# Patient Record
Sex: MALE | URBAN - NONMETROPOLITAN AREA
[De-identification: names, ages, dates, MRNs, and addresses within clinical notes are randomized per-mention and may not be internally consistent; named-entity substitution may affect disease eponyms.]

---

## 2019-04-29 ENCOUNTER — PATIENT MESSAGE (OUTPATIENT)
Dept: UROLOGY | Age: 44
End: 2019-04-29

## 2019-04-29 ENCOUNTER — OFFICE VISIT (OUTPATIENT)
Dept: UROLOGY | Age: 44
End: 2019-04-29
Payer: COMMERCIAL

## 2019-04-29 VITALS — TEMPERATURE: 97.6 F | BODY MASS INDEX: 33.05 KG/M2 | WEIGHT: 244 LBS | HEIGHT: 72 IN

## 2019-04-29 DIAGNOSIS — N48.6 PEYRONIE'S SYNDROME: Primary | ICD-10-CM

## 2019-04-29 DIAGNOSIS — N52.03 COMBINED ARTERIAL INSUFFICIENCY AND CORPORO-VENOUS OCCLUSIVE ERECTILE DYSFUNCTION: ICD-10-CM

## 2019-04-29 LAB
BILIRUBIN, POC: 0
BLOOD URINE, POC: NORMAL
CLARITY, POC: CLEAR
COLOR, POC: YELLOW
GLUCOSE URINE, POC: NORMAL
KETONES, POC: NORMAL
LEUKOCYTE EST, POC: NORMAL
NITRITE, POC: NORMAL
PH, POC: 6.5
PROTEIN, POC: NORMAL
SPECIFIC GRAVITY, POC: 1.02
UROBILINOGEN, POC: 0.2

## 2019-04-29 PROCEDURE — 81003 URINALYSIS AUTO W/O SCOPE: CPT | Performed by: UROLOGY

## 2019-04-29 PROCEDURE — 99204 OFFICE O/P NEW MOD 45 MIN: CPT | Performed by: UROLOGY

## 2019-04-29 RX ORDER — TADALAFIL 10 MG/1
10 TABLET ORAL DAILY PRN
Qty: 20 TABLET | Refills: 5 | Status: SHIPPED | OUTPATIENT
Start: 2019-04-29 | End: 2019-06-12

## 2019-04-29 RX ORDER — OMEPRAZOLE 20 MG/1
CAPSULE, DELAYED RELEASE ORAL
Refills: 8 | COMMUNITY
Start: 2019-04-16

## 2019-04-29 RX ORDER — HYDROXYZINE HYDROCHLORIDE 25 MG/1
TABLET, FILM COATED ORAL
COMMUNITY
Start: 2019-04-27

## 2019-04-29 RX ORDER — MULTIPLE VITAMINS W/ MINERALS TAB 9MG-400MCG
1 TAB ORAL
COMMUNITY

## 2019-04-29 RX ORDER — AMOXICILLIN 875 MG/1
875 TABLET, COATED ORAL 2 TIMES DAILY
COMMUNITY
End: 2019-06-12 | Stop reason: ALTCHOICE

## 2019-04-29 ASSESSMENT — ENCOUNTER SYMPTOMS
COUGH: 0
BLOOD IN STOOL: 0
DIARRHEA: 0
RHINORRHEA: 0
NAUSEA: 0
ABDOMINAL DISTENTION: 0
FACIAL SWELLING: 0
EYE DISCHARGE: 0
BACK PAIN: 0
SINUS PRESSURE: 0
WHEEZING: 0
EYE REDNESS: 0
CONSTIPATION: 0
VOMITING: 0
SORE THROAT: 0
ABDOMINAL PAIN: 0
EYE PAIN: 0
COLOR CHANGE: 0
SHORTNESS OF BREATH: 0

## 2019-04-29 NOTE — PROGRESS NOTES
Mera Liu is a 41 y.o. male who presents today   Chief Complaint   Patient presents with    New Patient     I was referred here by Leif Jimenez for Peyronies     Peyronie's Disease:  Patient is here today for Peyronie's disease which started approximately 10 years(s) ago. His erect penis curves upward. The estimated degree of curvature is 30 degrees. Are erections painful? Sometimes over last year  Pain severity: mild  Problem with penetration? No  Any history of penile injury? no    His erections are partially present. Difficulting maintaining erection? Yes. Premature Ejaculation? No  Sex drive/libido: normal    Previous treatments tried for Peyronie's disease or Erectile Dysfunction:  none          History reviewed. No pertinent past medical history. Past Surgical History:   Procedure Laterality Date    TONSILLECTOMY      WISDOM TOOTH EXTRACTION         Current Outpatient Medications   Medication Sig Dispense Refill    omeprazole (PRILOSEC) 20 MG delayed release capsule TK 1 C PO D  8    hydrOXYzine (ATARAX) 25 MG tablet       Multiple Vitamins-Minerals (THERA M PLUS) TABS Take 1 tablet by mouth      amoxicillin (AMOXIL) 875 MG tablet Take 875 mg by mouth 2 times daily      tadalafil (CIALIS) 10 MG tablet Take 1 tablet by mouth daily as needed for Erectile Dysfunction 20 tablet 5     No current facility-administered medications for this visit.         No Known Allergies    Social History     Socioeconomic History    Marital status: Unknown     Spouse name: None    Number of children: None    Years of education: None    Highest education level: None   Occupational History    None   Social Needs    Financial resource strain: None    Food insecurity:     Worry: None     Inability: None    Transportation needs:     Medical: None     Non-medical: None   Tobacco Use    Smoking status: Former Smoker    Smokeless tobacco: Never Used   Substance and Sexual Activity    Alcohol use: None    Drug use: None    Sexual activity: Yes     Comment: occ   Lifestyle    Physical activity:     Days per week: None     Minutes per session: None    Stress: None   Relationships    Social connections:     Talks on phone: None     Gets together: None     Attends Zoroastrianism service: None     Active member of club or organization: None     Attends meetings of clubs or organizations: None     Relationship status: None    Intimate partner violence:     Fear of current or ex partner: None     Emotionally abused: None     Physically abused: None     Forced sexual activity: None   Other Topics Concern    None   Social History Narrative    None       Family History   Problem Relation Age of Onset    Cancer Maternal Uncle     Cancer Maternal Grandmother        REVIEW OF SYSTEMS:  Review of Systems   Constitutional: Negative for activity change, chills, fatigue and fever. HENT: Negative for congestion, ear discharge, ear pain, facial swelling, mouth sores, rhinorrhea, sinus pressure and sore throat. Eyes: Negative for pain, discharge and redness. Respiratory: Negative for cough, shortness of breath and wheezing. Cardiovascular: Negative for chest pain, palpitations and leg swelling. Gastrointestinal: Negative for abdominal distention, abdominal pain, blood in stool, constipation, diarrhea, nausea and vomiting. Endocrine: Negative for polydipsia, polyphagia and polyuria. Genitourinary: Negative for decreased urine volume, difficulty urinating, dysuria, enuresis, flank pain, frequency, genital sores, hematuria and urgency. Musculoskeletal: Negative for back pain, gait problem, joint swelling, neck pain and neck stiffness. Skin: Negative for color change, rash and wound. Allergic/Immunologic: Negative for environmental allergies and immunocompromised state. Neurological: Negative for dizziness, syncope, weakness, light-headedness, numbness and headaches.    Psychiatric/Behavioral: Negative for agitation, confusion, dysphoric mood, self-injury, sleep disturbance and suicidal ideas. The patient is not hyperactive. PHYSICAL EXAM:  Temp 97.6 °F (36.4 °C) (Temporal)   Ht 6' (1.829 m)   Wt 244 lb (110.7 kg)   BMI 33.09 kg/m²   Physical Exam   Constitutional: He is oriented to person, place, and time. He appears well-developed and well-nourished. HENT:   Head: Normocephalic and atraumatic. Eyes: Conjunctivae are normal. No scleral icterus. Neck: Normal range of motion. Cardiovascular: Normal rate, regular rhythm and intact distal pulses. Pulmonary/Chest: Effort normal and breath sounds normal. No respiratory distress. Abdominal: Soft. Bowel sounds are normal. He exhibits no distension and no mass. There is no tenderness. Hernia confirmed negative in the right inguinal area and confirmed negative in the left inguinal area. Genitourinary: Testes normal and penis normal. Prostate is not enlarged and not tender. Right testis shows no mass, no swelling and no tenderness. Left testis shows no mass, no swelling and no tenderness. Circumcised. No phimosis. No discharge found. Genitourinary Comments: He had a discrete plaque about mid shaft in the dorsal aspect of the corporal bodies consistent with a Peyronie's plaque   Musculoskeletal: Normal range of motion. He exhibits no edema or tenderness. Lymphadenopathy:     He has no cervical adenopathy. Right: No inguinal adenopathy present. Left: No inguinal adenopathy present. Neurological: He is alert and oriented to person, place, and time. Skin: Skin is warm and dry. Psychiatric: He has a normal mood and affect. His behavior is normal.   Nursing note and vitals reviewed.           DATA:    Results for orders placed or performed in visit on 04/29/19   POCT Urinalysis No Micro (Auto)   Result Value Ref Range    Color, UA yellow     Clarity, UA clear     Glucose, UA POC neg     Bilirubin, UA 0     Ketones, UA neg     Spec Grav, UA 1.020     Blood, UA POC neg     pH, UA 6.5     Protein, UA POC neg     Urobilinogen, UA 0.2     Leukocytes, UA neg     Nitrite, UA neg      1. Peyronie's syndrome  He was given some information on Xiaflex today and was told how to document the degree of curvature and the point of curvature. 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 - tadalafil (CIALIS) 10 MG tablet; Take 1 tablet by mouth daily as needed for Erectile Dysfunction  Dispense: 20 tablet; Refill: 5      Orders Placed This Encounter   Procedures    POCT Urinalysis No Micro (Auto)        Return in about 6 weeks (around 6/10/2019). EMR Dragon/transcription disclaimer: Much of this documentt is electronic  transcription/translation of spoken language to printed text. The  electronic translation of spoken language may be erroneous, or at times,  nonsensical words or phrases may be inadvertently transcribed.  Although I  have reviewed the document for such errors, some may still exist.

## 2019-05-15 NOTE — TELEPHONE ENCOUNTER
Patient called. I told him unfortunately this type of medicine usually is not covered even with a PA. He could check online for coupons or ask pharmacy to fill smaller quantity. He voiced understanding.

## 2019-06-12 ENCOUNTER — OFFICE VISIT (OUTPATIENT)
Dept: UROLOGY | Age: 44
End: 2019-06-12
Payer: COMMERCIAL

## 2019-06-12 VITALS — WEIGHT: 245 LBS | BODY MASS INDEX: 33.23 KG/M2 | TEMPERATURE: 97.6 F

## 2019-06-12 DIAGNOSIS — N48.6 PEYRONIE'S SYNDROME: Primary | ICD-10-CM

## 2019-06-12 DIAGNOSIS — N52.03 COMBINED ARTERIAL INSUFFICIENCY AND CORPORO-VENOUS OCCLUSIVE ERECTILE DYSFUNCTION: ICD-10-CM

## 2019-06-12 LAB
BILIRUBIN, POC: 0
BLOOD URINE, POC: NORMAL
CLARITY, POC: CLEAR
COLOR, POC: YELLOW
GLUCOSE URINE, POC: NORMAL
KETONES, POC: NORMAL
LEUKOCYTE EST, POC: NORMAL
NITRITE, POC: NORMAL
PH, POC: 7
PROTEIN, POC: NORMAL
SPECIFIC GRAVITY, POC: 1.01
UROBILINOGEN, POC: 0.2

## 2019-06-12 PROCEDURE — 99213 OFFICE O/P EST LOW 20 MIN: CPT | Performed by: UROLOGY

## 2019-06-12 PROCEDURE — 81003 URINALYSIS AUTO W/O SCOPE: CPT | Performed by: UROLOGY

## 2019-06-12 ASSESSMENT — ENCOUNTER SYMPTOMS
SINUS PRESSURE: 0
BLOOD IN STOOL: 0
CONSTIPATION: 0
COLOR CHANGE: 0
WHEEZING: 0
ABDOMINAL PAIN: 0
EYE REDNESS: 0
NAUSEA: 0
EYE PAIN: 0
VOMITING: 0
SHORTNESS OF BREATH: 0
FACIAL SWELLING: 0
ABDOMINAL DISTENTION: 0
RHINORRHEA: 0
BACK PAIN: 0
EYE DISCHARGE: 0
COUGH: 0
DIARRHEA: 0
SORE THROAT: 0

## 2019-06-12 NOTE — PROGRESS NOTES
Aisha Lawrence is a 41 y.o. male who presents today   Chief Complaint   Patient presents with    Follow-up     I'm here for a f/u on peyronies       Peyronie's Disease:  Patient is here today for Peyronie's disease which started approximately 10 years(s) ago. His erect penis curves upward. The estimated degree of curvature is 40 degrees. Are erections painful? Sometimes over last year  Pain severity: mild  Problem with penetration? No  Any history of penile injury? no    His erections are partially present. He was given a prescription for Cialis and he says his ability to get and maintain erection is much improved. Difficulting maintaining erection? Yes. Proved on Cialis  Premature Ejaculation? No  Sex drive/libido: normal    Previous treatments tried for Peyronie's disease or Erectile Dysfunction:  none  I started him on Cialis to see if this improve the quality of his erections and this he reports he has accomplished his goal been able to get and maintain erection the curvature seems to be less bothersome to him and his fiancée at this time. We did at the last visit discuss management options specifically Xiaflex for curvature. History reviewed. No pertinent past medical history. Past Surgical History:   Procedure Laterality Date    TONSILLECTOMY      WISDOM TOOTH EXTRACTION         Current Outpatient Medications   Medication Sig Dispense Refill    omeprazole (PRILOSEC) 20 MG delayed release capsule TK 1 C PO D  8    hydrOXYzine (ATARAX) 25 MG tablet       Multiple Vitamins-Minerals (THERA M PLUS) TABS Take 1 tablet by mouth       No current facility-administered medications for this visit.         No Known Allergies    Social History     Socioeconomic History    Marital status: Unknown     Spouse name: None    Number of children: None    Years of education: None    Highest education level: None   Occupational History    None   Social Needs    Financial resource strain: None   MarginLeft insecurity:     Worry: None     Inability: None    Transportation needs:     Medical: None     Non-medical: None   Tobacco Use    Smoking status: Former Smoker    Smokeless tobacco: Never Used   Substance and Sexual Activity    Alcohol use: None    Drug use: None    Sexual activity: Yes     Comment: occ   Lifestyle    Physical activity:     Days per week: None     Minutes per session: None    Stress: None   Relationships    Social connections:     Talks on phone: None     Gets together: None     Attends Gnosticism service: None     Active member of club or organization: None     Attends meetings of clubs or organizations: None     Relationship status: None    Intimate partner violence:     Fear of current or ex partner: None     Emotionally abused: None     Physically abused: None     Forced sexual activity: None   Other Topics Concern    None   Social History Narrative    None       Family History   Problem Relation Age of Onset    Cancer Maternal Uncle     Cancer Maternal Grandmother        REVIEW OF SYSTEMS:  Review of Systems   Constitutional: Negative for activity change, chills, fatigue and fever. HENT: Negative for congestion, ear discharge, ear pain, facial swelling, mouth sores, rhinorrhea, sinus pressure and sore throat. Eyes: Negative for pain, discharge and redness. Respiratory: Negative for cough, shortness of breath and wheezing. Cardiovascular: Negative for chest pain, palpitations and leg swelling. Gastrointestinal: Negative for abdominal distention, abdominal pain, blood in stool, constipation, diarrhea, nausea and vomiting. Endocrine: Negative for polydipsia, polyphagia and polyuria. Genitourinary: Negative for decreased urine volume, difficulty urinating, dysuria, enuresis, flank pain, frequency, genital sores, hematuria and urgency. Musculoskeletal: Negative for back pain, gait problem, joint swelling, neck pain and neck stiffness.    Skin: Negative for color change, rash and wound. Allergic/Immunologic: Negative for environmental allergies and immunocompromised state. Neurological: Negative for dizziness, syncope, weakness, light-headedness, numbness and headaches. Psychiatric/Behavioral: Negative for agitation, confusion, dysphoric mood, self-injury, sleep disturbance and suicidal ideas. The patient is not hyperactive. PHYSICAL EXAM:  Temp 97.6 °F (36.4 °C) (Temporal)   Wt 245 lb (111.1 kg)   BMI 33.23 kg/m²   Physical Exam   Constitutional: He is oriented to person, place, and time. He appears well-developed and well-nourished. HENT:   Head: Normocephalic and atraumatic. Eyes: Conjunctivae are normal. No scleral icterus. Neck: Normal range of motion. Cardiovascular: Normal rate, regular rhythm and intact distal pulses. Pulmonary/Chest: Effort normal and breath sounds normal. No respiratory distress. Abdominal: Soft. Bowel sounds are normal. He exhibits no distension and no mass. There is no tenderness. Hernia confirmed negative in the right inguinal area and confirmed negative in the left inguinal area. Genitourinary: Testes normal and penis normal. Prostate is not enlarged and not tender. Right testis shows no mass, no swelling and no tenderness. Left testis shows no mass, no swelling and no tenderness. Circumcised. No phimosis. No discharge found. Genitourinary Comments: He had a discrete plaque about mid shaft in the dorsal aspect of the corporal bodies consistent with a Peyronie's plaque    He brought in a cell phone photograph today this does show about a 40 degree curvature about mid shaft upward. Musculoskeletal: Normal range of motion. He exhibits no edema or tenderness. Lymphadenopathy:     He has no cervical adenopathy. Right: No inguinal adenopathy present. Left: No inguinal adenopathy present. Neurological: He is alert and oriented to person, place, and time. Skin: Skin is warm and dry.    Psychiatric: He has a normal mood and affect. His behavior is normal.   Nursing note and vitals reviewed. DATA:    Results for orders placed or performed in visit on 06/12/19   POCT Urinalysis No Micro (Auto)   Result Value Ref Range    Color, UA yellow     Clarity, UA clear     Glucose, UA POC neg     Bilirubin, UA 0     Ketones, UA neg     Spec Grav, UA 1.015     Blood, UA POC neg     pH, UA 7.0     Protein, UA POC neg     Urobilinogen, UA 0.2     Leukocytes, UA neg     Nitrite, UA neg        1. Peyronie's syndrome  With Cialis he has achieved his goal and actually is functioning fairly well. We will just continue to monitor this. 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 - POCT Urinalysis No Micro (Auto)      Orders Placed This Encounter   Procedures    POCT Urinalysis No Micro (Auto)        Return in about 6 months (around 12/12/2019). EMR Dragon/transcription disclaimer: Much of this documentt is electronic  transcription/translation of spoken language to printed text. The  electronic translation of spoken language may be erroneous, or at times,  nonsensical words or phrases may be inadvertently transcribed.  Although I  have reviewed the document for such errors, some may still exist.

## 2019-07-03 RX ORDER — TADALAFIL 20 MG/1
20 TABLET ORAL PRN
Qty: 15 TABLET | Refills: 2 | Status: SHIPPED | OUTPATIENT
Start: 2019-07-03 | End: 2020-07-02

## 2019-12-16 ENCOUNTER — OFFICE VISIT (OUTPATIENT)
Dept: UROLOGY | Age: 44
End: 2019-12-16
Payer: COMMERCIAL

## 2019-12-16 VITALS — TEMPERATURE: 98 F | HEIGHT: 72 IN | WEIGHT: 240 LBS | BODY MASS INDEX: 32.51 KG/M2

## 2019-12-16 DIAGNOSIS — N48.6 PEYRONIE'S SYNDROME: Primary | ICD-10-CM

## 2019-12-16 LAB
BACTERIA URINE, POC: NORMAL
BILIRUBIN URINE: 0 MG/DL
BLOOD, URINE: POSITIVE
CASTS URINE, POC: NORMAL
CLARITY: CLEAR
COLOR: YELLOW
CRYSTALS URINE, POC: NORMAL
EPI CELLS URINE, POC: NORMAL
GLUCOSE URINE: NORMAL
KETONES, URINE: NEGATIVE
LEUKOCYTE EST, POC: NORMAL
NITRITE, URINE: NEGATIVE
PH UA: 5.5 (ref 4.5–8)
PROTEIN UA: NEGATIVE
RBC URINE, POC: NORMAL
SPECIFIC GRAVITY UA: 1.02 (ref 1–1.03)
UROBILINOGEN, URINE: NORMAL
WBC URINE, POC: NORMAL
YEAST URINE, POC: NORMAL

## 2019-12-16 PROCEDURE — 81001 URINALYSIS AUTO W/SCOPE: CPT | Performed by: UROLOGY

## 2019-12-16 PROCEDURE — 99214 OFFICE O/P EST MOD 30 MIN: CPT | Performed by: UROLOGY

## 2019-12-16 ASSESSMENT — ENCOUNTER SYMPTOMS
SHORTNESS OF BREATH: 0
EYE REDNESS: 0
BACK PAIN: 0
WHEEZING: 0
CONSTIPATION: 0
EYE DISCHARGE: 0
ABDOMINAL PAIN: 0
DIARRHEA: 0
COUGH: 0

## 2022-01-20 RX ORDER — TADALAFIL 20 MG/1
20 TABLET ORAL PRN
Qty: 15 TABLET | Refills: 1 | OUTPATIENT
Start: 2022-01-20 | End: 2023-01-20

## 2023-05-10 ENCOUNTER — HOSPITAL ENCOUNTER (OUTPATIENT)
Dept: SLEEP CENTER | Age: 48
Discharge: HOME | End: 2023-05-12
Payer: COMMERCIAL

## 2023-05-10 PROCEDURE — G0399 HOME SLEEP TEST/TYPE 3 PORTA: HCPCS

## 2023-05-11 PROCEDURE — G0399 HOME SLEEP TEST/TYPE 3 PORTA: HCPCS

## 2023-05-18 DIAGNOSIS — G47.33 OSA (OBSTRUCTIVE SLEEP APNEA): Primary | ICD-10-CM
